# Patient Record
(demographics unavailable — no encounter records)

---

## 2025-03-25 NOTE — DATA REVIEWED
[FreeTextEntry1] : \par  8/25/16 REEG\par  \par  Impression: This is a normal EEG in the awake state that was somewhat limited by artifact..  \par  \par  -----------\par  \par  MRi brain at age 8 -9 normal per mom

## 2025-03-25 NOTE — HISTORY OF PRESENT ILLNESS
[FreeTextEntry1] : The child was accompanied by his parents. He was previously seen here in 8/15/2008 when he was diagnosed with autistic disorder. He is functioning in a special education class. He cannot read or do a simple arithmetic. On 9/6/2014 he had his first seizure; the seizure lasted for less than a minute and was reported as convulsive. An EEG on 9/6/2014 was read as a normal awake study. The child had another seizure in 12/20/2014 while in [place].The seizure was described as similar to the first one. A CAT scan of the brain was read as normal. Of note is that the child had a brain MRI which was read as normal about 5 years ago when he was evaluated in [facility]. Routine blood tests on 9/6/14 including metabolic panel and CBC were normal. Following the second seizure the child was started on Depakene 250 mg/5 mL, 4.2 mL twice daily. His past medical history significant for colitis and an episode of pancreatitis.  3/26/2015: Was accompanied by his parents. Remain seizure free on Depakene 250 mg/5 mL, 4.5 mL twice daily. Blood test drawn by gastroenterology in February 2015 normal including CBC, metabolic panel, lipase, and Depakene level of 61. There are no new physical or medical concerns.  9/30/2015  remains seizure-free on Depakene 250 mg/5 mL, 4.5mL twice daily. Being seen by gastroenterology to Crohn disease.    March 16, 2016:   accompanied by his mother. Seizure-free since December 2014. Altogether had 2 seizures. EEG is in the past were either normal or technically difficult. On Depakene 250 mg/5 mL, 4.5 mL twice a day.   9/6/16  A 14-year-old child with autistic disorder with seizures. His neurological examination is non-focal. I continued the Depakene at 250 mg/5 mL, 4.5 mL twice a day. CBC, metabolic panel, medication level were obtained normal on 2/2016. Followup is requested in 6 months with an EEG.   3/7/17 no seizures > 2 years. on Depakene 4.5 ml po bid. Level was 47 in Jan 2017. Has Diastat 10 mg . Last in 12/14. In special school - [employer] [employer] [employer] - part of [facility] - gets OT 1 x30 / ST 2x30 no home services. Gets JULIAN in school. Moved into residential services at [facility] in October 2016 - spends one night a week there - 6 boys in a private home. Plan: Remains seizure-free. We will slowly taper the medication by not increasing it. Mother understands and is aware if there is seizure recurrence. Seizure precautions discussed. Return in 4 months  9/12/17 here with mom. Lives in residential facility almost a year.  Can come home for weekends. Doing well. No seizures. Allowing him to outgrow his dose. In school - [employer] therapy. Depakene 250mg/5mL, 4.5 mLs BID.   3/13/2018: here with mom. Lives in residential facility almost a year.  Can come home for weekends. Doing well. No seizures. Allowing him to outgrow his dose. In school - [employer] therapy. Depakene 250mg/5mL, 4.5 mLs BID.    9/14/2018: with mom. Lives in residential facility almost a year.  Can come home for weekends. Doing well. No seizures. In school - [employer] therapy. Depakene 250mg/5mL, 4.5 mLs BID.   11/19/2019: 9/14/2018: with mom. Lives in residential facility.  Can come home for weekends. Doing well. No seizures. In school - [facility] therapy. Depakene 250mg/5mL,5 mLs BID.   5/19/2020 with Ms. Garcia [facility] home manager. Forest is doing well, remains seizure free.  On Depakene 250mg/5mL,5 mLs BID.   8/19/2020 with Ms. Curry his care taker in  [facility] in a Telehealth visit.  I also spoke by phone with his parents. Forest is doing well, remains seizure free on Depakene 250mg/5mL, 6 mLs BID.   2/8/2021 with Ms. Curry his care taker in  [facility] in person visit.  Forest is doing well, remains seizure free on Depakene 250mg/5mL, 6 mLs BID.   8/6/2021 with his mother. Forest is doing well, remains seizure free on Depakene 250mg/5mL, 6 mLs BID.   2/24/2022 with his mother. Forest is doing well, remains seizure free on Depakene 250mg/5mL, 6 mLs BID.  No clinical change.   9/19/2022  with his mother. Forest is doing well, remains seizure free on Depakene 250mg/5mL, 6 mLs BID.  No clinical change. She reported that Forest has 2 first cousins who have seizures. One of them had a possible  genetic testing (SCN..)   9/18/2023 with her mother.   with his mother. Forest is doing well, remains seizure free on Depakene 250mg/5mL, 6 mLs BID.  No clinical change.   3/26/2024  with her mother.   with his mother. Forest is doing well, remains seizure free on Depakene 250mg/5mL, 6 mLs BID.  No clinical change.   9/24/2024 with a care taker. Forest is doing well, remains seizure free on Depakene 250mg/5mL, 6 mLs BID.  No clinical change.   3/25/2025  with  a care taker from [facility] . Remains seizure free on Depakene 250mg/5mL, 6 mLs BID. No new complaints.

## 2025-03-25 NOTE — REASON FOR VISIT
[Follow-Up Evaluation] : a follow-up evaluation for [ADHD] : ADHD [Seizure] : seizure [Other: ____] : [unfilled] [Mother] : mother [Other: _____] : [unfilled] [Home] : at home, [unfilled] , at the time of the visit. [Other Location: e.g. Home (Enter Location, City,State)___] : at [unfilled]

## 2025-03-25 NOTE — ASSESSMENT
[FreeTextEntry1] : Forest continues to do well. Remains seizure free. In person exam was normal. \par  \par

## 2025-03-25 NOTE — CONSULT LETTER
[Dear  ___] : Dear  [unfilled], [Courtesy Letter:] : I had the pleasure of seeing your patient, [unfilled], in my office today. [Please see my note below.] : Please see my note below. [Sincerely,] : Sincerely, [FreeTextEntry3] : Dr. Waddell

## 2025-07-21 NOTE — CONSULT LETTER
[Dear  ___] : Dear  [unfilled], [Courtesy Letter:] : I had the pleasure of seeing your patient, [unfilled], in my office today. [Falguni Ron MD] : Falguni Ron MD [[facility]] : [facility]

## 2025-07-21 NOTE — HISTORY OF PRESENT ILLNESS
[de-identified] : Forest is an 24 yo with autism, h/o seizure (last seizure 12/2014), on depakote, and Ileo-colonic Crohns disease diagnosed in April of 2013, who was last seen 7/8/24.   On Humira 40mg QOWeek and feeling well. Last trough 19 7/15/24. His defecatory pattern is unclear because he lives in a group home and flushes the toilet too quickly for anyone to see his stool. He does not seem to have defecatory urgency, nor rectal bleeding/mucous. He no longer has frequent trips to the restroom to disrupt his therapies and activities in school. His family feels that he is no longer in pain. He has a fair appetite, has not vomited. Weight stable. Living in residential facility in [place].  Note: (Never did MRI to evaluate small bowel). Last colonoscopy6/21/22: aphthous ulcerations in TI, otherwise unremarkable. BX all NEGATIVE.

## 2025-07-21 NOTE — ASSESSMENT
[Educated Patient & Family about Diagnosis] : educated the patient and family about the diagnosis [FreeTextEntry1] : 24 yo with autism, h/o seizure on Depakene, and Crohn's disease in remission, on Humira since 8/2015, now every other week, without weight loss and with progression through puberty. Unremarkable CBC, CMP, CRP. Last ADA trough 19 (7/8/24). Intermittent constipation.  1. continue Humira 40 mg qOW. ADA level today 2.  FUV with adult GI Dr Sy 3. EGD/colonoscopy by adult gastroenterologist  4. Blood work yearly (today) 5. Instructed to call prn.

## 2025-07-21 NOTE — PHYSICAL EXAM
[Well Developed] : well developed [Well Nourished] : well nourished [NAD] : in no acute distress [PERRL] : pupils were equal, round, reactive to light  [EOMI] : ~T the extraocular movements were normal and intact [CTAB] : lungs clear to auscultation bilaterally [Regular Rate and Rhythm] : regular rate and rhythm [Soft] : soft  [Normal Bowel Sounds] : normal bowel sounds [Pawan Stage ___] : Pawan stage [unfilled] [Well-Perfused] : well-perfused [Interactive] : interactive [Distended] : non distended [Tender] : non tender [Lymphadenopathy] : no lymphadenopathy  [FreeTextEntry1] : MOHAN, autistic